# Patient Record
Sex: FEMALE | Employment: FULL TIME | URBAN - METROPOLITAN AREA
[De-identification: names, ages, dates, MRNs, and addresses within clinical notes are randomized per-mention and may not be internally consistent; named-entity substitution may affect disease eponyms.]

---

## 2017-09-01 ENCOUNTER — OFFICE VISIT (OUTPATIENT)
Dept: ORTHOPEDIC SURGERY | Age: 52
End: 2017-09-01

## 2017-09-01 VITALS
DIASTOLIC BLOOD PRESSURE: 85 MMHG | HEIGHT: 63 IN | SYSTOLIC BLOOD PRESSURE: 151 MMHG | HEART RATE: 65 BPM | RESPIRATION RATE: 16 BRPM | WEIGHT: 239.8 LBS | BODY MASS INDEX: 42.49 KG/M2

## 2017-09-01 DIAGNOSIS — M17.12 OSTEOARTHRITIS OF LEFT KNEE, UNSPECIFIED OSTEOARTHRITIS TYPE: Primary | ICD-10-CM

## 2017-09-01 RX ORDER — CLOTRIMAZOLE AND BETAMETHASONE DIPROPIONATE 10; .64 MG/G; MG/G
CREAM TOPICAL 2 TIMES DAILY
COMMUNITY

## 2017-09-01 RX ORDER — DOXYCYCLINE 100 MG/1
CAPSULE ORAL
Refills: 0 | COMMUNITY
Start: 2017-06-07

## 2017-09-01 RX ORDER — NIFEDIPINE 90 MG/1
TABLET, FILM COATED, EXTENDED RELEASE ORAL
Refills: 1 | COMMUNITY
Start: 2017-08-12

## 2017-09-01 RX ORDER — LOSARTAN POTASSIUM AND HYDROCHLOROTHIAZIDE 25; 100 MG/1; MG/1
TABLET ORAL
Refills: 1 | COMMUNITY
Start: 2017-08-12

## 2017-09-01 RX ORDER — ASPIRIN 81 MG/1
TABLET ORAL DAILY
COMMUNITY

## 2017-09-01 RX ORDER — METHYLPREDNISOLONE 4 MG/1
TABLET ORAL
Qty: 1 DOSE PACK | Refills: 0 | Status: SHIPPED | OUTPATIENT
Start: 2017-09-01

## 2017-09-01 RX ORDER — CHOLECALCIFEROL (VITAMIN D3) 125 MCG
CAPSULE ORAL
COMMUNITY

## 2017-09-01 NOTE — MR AVS SNAPSHOT
Visit Information Date & Time Provider Department Dept. Phone Encounter #  
 9/1/2017  1:55 PM El Lopez  [facility] [address] [facility] - SPECIALTY [facility] [phone] Follow-up Instructions Return in about 4 weeks (around 9/29/2017). Upcoming Health Maintenance Date Due Hepatitis C Screening 1965 DTaP/Tdap/Td series (1 - Tdap) 6/7/1986 PAP AKA CERVICAL CYTOLOGY 6/7/1986 BREAST CANCER SCRN MAMMOGRAM 6/7/2015 FOBT Q 1 YEAR AGE 50-75 6/7/2015 INFLUENZA AGE 9 TO ADULT 8/1/2017 Allergies as of 9/1/2017  Review Complete On: 9/1/2017 By: El Lopez MD  
 No Known Allergies Current Immunizations  Never Reviewed No immunizations on file. Not reviewed this visit You Were Diagnosed With   
  
 Codes Comments Osteoarthritis of left knee, unspecified osteoarthritis type    -  Primary ICD-10-CM: M17.12 
ICD-9-CM: 715.96 Vitals BP Pulse Resp Height(growth percentile) Weight(growth percentile) BMI  
 151/85 65 16 5' 3\" (1.6 m) 239 lb 12.8 oz (108.8 kg) 42.48 kg/m2 Smoking Status Never Smoker Vitals History BMI and BSA Data Body Mass Index Body Surface Area  
 42.48 kg/m 2 2.2 m 2 Preferred Pharmacy Pharmacy Name Phone [facility], [address] Your Updated Medication List  
  
   
This list is accurate as of: 9/1/17  3:10 PM.  Always use your most recent med list.  
  
  
  
  
 aspirin delayed-release 81 mg tablet Take  by mouth daily. clotrimazole-betamethasone topical cream  
Commonly known as:  Onalee Meraux Apply  to affected area two (2) times a day. doxycycline 100 mg capsule Commonly known as:  VIBRAMYCIN  
take 1 capsule by mouth once daily for 3 MONTHS WITH FOOD  
  
 ketoconazole 1 % Sham  
by Apply Externally route. losartan-hydroCHLOROthiazide 100-25 mg per tablet Commonly known as:  HYZAAR  
  
 methylPREDNISolone 4 mg tablet Commonly known as:  Reza Pena Per dose pack instructions NIFEdipine ER 90 mg ER tablet Commonly known as:  ADALAT CC  
  
 VITAMIN D3 2,000 unit Tab Generic drug:  cholecalciferol (vitamin D3) Take  by mouth. Prescriptions Sent to Pharmacy Refills  
 methylPREDNISolone (MEDROL DOSEPACK) 4 mg tablet 0 Sig: Per dose pack instructions Class: Normal  
 Pharmacy: [id number], [address] [place] [address] #: [phone] Follow-up Instructions Return in about 4 weeks (around 9/29/2017). Introducing \[facility] [place] [facility]\"" & HEALTH SERVICES! [facility] introduces [facility] patient portal. Now you can access parts of your medical record, email your doctor's office, and request medication refills online. 1. In your internet browser, go to [email or web address] 2. Click on the First Time User? Click Here link in the Sign In box. You will see the New Member Sign Up page. 3. Enter your [facility] Access Code exactly as it appears below. You will not need to use this code after youve completed the sign-up process. If you do not sign up before the expiration date, you must request a new code. · [facility] [employer] Access Code: [email or web address] Expires: 11/29/2017 11:26 AM 
 
4. Enter the last four digits of your Social Security Number (xxxx) and Date of Birth (mm/dd/yyyy) as indicated and click Submit. You will be taken to the next sign-up page. 5. Create a [employer] ID. This will be your [facility] login ID and cannot be changed, so think of one that is secure and easy to remember. 6. Create a [facility] password. You can change your password at any time. 7. Enter your Password Reset Question and Answer. This can be used at a later time if you forget your password. 8. Enter your e-mail address. You will receive e-mail notification when new information is available in [address] 9. Click Sign Up. You can now view and download portions of your medical record. 10. Click the Download Summary menu link to download a portable copy of your medical information. If you have questions, please visit the Frequently Asked Questions section of the "[employer])" website. Remember, "[employer] is NOT to be used for urgent needs. For medical emergencies, dial 911. Now available from your iPhone and Android! Please provide this summary of care documentation to your next provider. Your primary care clinician is listed as Galileo Hash. If you have any questions after today's visit, please call [phone].

## 2017-09-01 NOTE — PROGRESS NOTES
[facility]  [address], [address]  Phone: [phone]  Fax: [phone]        INITIAL CONSULTATION      HISTORY OF PRESENT ILLNESS:  Brooklyn Mahmood is a 46 y.o. female whom is referred from Dr. Billy Crump secondary to pain localized to the popliteal fossa of the left knee. Pt denies low back pain or radicular type symptoms. She rates pain 4/10. Symptoms do not occur at rest. Pt denies h/o spinal/knee pain or injections. Note from Dr. Billy Crump dated 6/28/17 indicating patient was seen with c/o left leg pain x 6 months. Of note, patient was seen in Urgent Care in January and February, and felt she pulled a muscle after exercising at home. At that time, she was treated with Flexeril with lack of relief. Pt denies h/o DM. The patient is RHD.  reviewed. Past Medical History:   Diagnosis Date    Hypertension    ORTH  Past Surgical History:   Procedure Laterality Date    HX ORTHOPAEDIC Left     pins in left ankle    HX OTHER SURGICAL      HX TUBAL LIGATION     OPAEDIC Left     pins in left ankle    HX OTHER SURGICAL      HX TUBAL LIGATION        Substance Use Topics    Smoking status: Never Smoker    Smokeless tobacco: Never Used    Alcohol use No     Work status: Not available. Marital status: The patient is single. No Known Allergies       No family history on file. REVIEW OF SYSTEMS  Constitutional symptoms: Negative  Eyes: Negative  Ears, Nose, Throat, and Mouth: Negative  Cardiovascular: Negative  Respiratory: Negative  Genitourinary: Negative  Integumentary (Skin and/or breast): Negative  Musculoskeletal: Positive for left knee pain. Extremities: Negative for edema.   Endocrine/Rheumatologic: Negative  Hematologic/Lymphatic: Negative  Allergic/Immunologic: Negative  Psychiatric: Negative       PHYSICAL EXAMINATION    Visit Vitals    /85  Comment: pt asymptomatic and has not taken her bp meds yet    Pulse 65    Resp 16    Ht 5' 3\" (1.6 m)    Wt 239 lb 12.8 oz (108.8 kg)    BMI 42.48 kg/m2       CONSTITUTIONAL: NAD, A&O x 3  HEART: Regular rate and rhythm  ABDOMEN: Positive bowel sounds, soft, nontender, and nondistended  LUNGS: Clear to auscultation bilaterally. SKIN: No rashes noted. RANGE OF MOTION: The patient has full passive range of motion in all four extremities. SENSATION: No significant increased tenderness to palpation of the left popliteal fossa. Sensation is intact to light touch throughout. MOTOR:   Straight Leg Raise: Negative, bilateral  Kelley: Negative, bilateral  Deep tendon reflexes are 2+ at the brachioradialis, biceps, and triceps. Deep tendon reflexes are 2+ at the knees and 1+ at the ankles bilaterally. Shoulder AB/Flex Elbow Flex Wrist Ext Elbow Ext Wrist Flex Hand Intrin Tone   Right +4/5 +4/5 +4/5 +4/5 +4/5 +4/5 +4/5   Left +4/5 +4/5 +4/5 +4/5 +4/5 +4/5 +4/5              Hip Flex Knee Ext Knee Flex Ankle DF GTE Ankle PF Tone   Right +4/5 +4/5 +4/5 +4/5 +4/5 +4/5 +4/5   Left +4/5 +4/5 +4/5 +4/5 +4/5 +4/5 +4/5       ASSESSMENT   Diagnoses and all orders for this visit:    1. Osteoarthritis of left knee, unspecified osteoarthritis type    Other orders  -     methylPREDNISolone (MEDROL DOSEPACK) 4 mg tablet; Per dose pack instructions           IMPRESSIONS/RECOMMENDATIONS:  Her pain is localized to the left popliteal fossa. Patient denies much in the way of low back or radicular type pain. She is neurologically intact. My sense is her symptoms are likely unrelated to spinal pathology and more related to knee pathology. I suspect she has a Baker's cyst. I offered to administer a left knee injection today in the office. She declines. I will start her on Medrol Dosepak. I will see the patient back in 1 month's time or earlier if needed. Written by Andriy Menjivar, as dictated by Mary Ann Knight MD  I examined the patient, reviewed and agree with the note.

## 2021-01-20 ENCOUNTER — HOSPITAL ENCOUNTER (OUTPATIENT)
Dept: PHYSICAL THERAPY | Age: 56
Discharge: HOME | End: 2021-01-20
Payer: COMMERCIAL

## 2021-01-20 PROCEDURE — 97140 MANUAL THERAPY 1/> REGIONS: CPT

## 2021-01-20 PROCEDURE — 97162 PT EVAL MOD COMPLEX 30 MIN: CPT

## 2021-01-20 PROCEDURE — 97110 THERAPEUTIC EXERCISES: CPT

## 2021-01-20 NOTE — PROGRESS NOTES
[facility]              [address]        [place], [address]   [phone] fax    Plan of Care/ Statement of Necessity for Physical Therapy Services  Patient name: Marni Balbuena Start of Care: 2021   Referral source: Mayra Davis MD : 1965    Medical Diagnosis: Low back pain [M54.5]  Payor: Rodrigo Bautista / Plan: Raheem Verdugo PPO / Product Type: PPO /  Onset Date:21    Treatment Diagnosis: Low back pain   Prior Hospitalization: see medical history Provider#: [id number]   Medications: Verified on Patient summary List    Comorbidities: OA, HTN, BMI >30, internal ankle hardware (s/p fracture )   Prior Level of Function: Independent, working full time without pain/difficulty     The Plan of Care and following information is based on the information from the initial evaluation. Assessment/ key information: Patient is 54year old female presenting with complaint of low back pain localized to the left side. She said the pain began about 2 weeks prior but with no specific incident. 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 Gait assessment reveals mildly increased lateral trunk translation towards left in stance and asymmetrical step length. Palpation in standing shows superior left iliac crest with ~1 inch leg length discrepancy observed in supine, improves with long axis distraction of the left lower extremity. Soft tissue evaluation reveals tenderness to palpation and trigger point of the left quadratus lumborum which is likely contributing to pelvic obliquity and leg length discrepancy. Lumbar AROM impaired in lateral flexion and rotation towards right. Lower extremity strength with full AROM but inability to maintain against resistance.  Patient would benefit from skilled physical therapy to improve upon the aforementioned deficits for return to pre-morbid status. Evaluation Complexity History MEDIUM  Complexity : 1-2 comorbidities / personal factors will impact the outcome/ POC ; Examination MEDIUM Complexity : 3 Standardized tests and measures addressing body structure, function, activity limitation and / or participation in recreation  ;Presentation MEDIUM Complexity : Evolving with changing characteristics  ; Clinical Decision Making LOW Complexity : FOTO score of   Overall Complexity Rating: MEDIUM  Problem List: pain affecting function, decrease ROM, impaired gait/ balance, decrease ADL/ functional abilitiies, decrease activity tolerance, decrease flexibility/ joint mobility and decrease transfer abilities   Treatment Plan may include any combination of the following: Therapeutic exercise, Therapeutic activities, Neuromuscular re-education, Gait/balance training, Manual therapy, Patient education, Functional mobility training and Stair training  Patient / Family readiness to learn indicated by: asking questions, trying to perform skills and interest  Persons(s) to be included in education: patient (P)  Barriers to Learning/Limitations: None  Patient Goal (s): to not have pain anymore  Patient Self Reported Health Status: good  Rehabilitation Potential: excellent    Short Term Goals: To be accomplished in 2 weeks:   1. Patient to be independent in HEP to maximize therapeutic benefit of care plan. 2. Patient to report standing for >=20 minutes with no onset of pain for improved tolerance to work related duties. Long Term Goals: To be accomplished in 4 weeks:   1. Patient to report standing at work for >=60 minuteswith no onset of pain for improved tolerance to work related duties. 2. No TTP to be present at left quadratus lumborum to indicate improvement in soft tissue mobility for improved pelvic symmetry and improved gait mechanics.    3. Patient to demonstrate symmetrical spinal AROM to for improved mechanics and reduced pain in ADLs and work tasks. Frequency / Duration: Patient to be seen 1-2 times per week for 4 weeks. Patient/ Caregiver education and instruction: Diagnosis, prognosis, self care, activity modification and exercises   [x]  Plan of care has been reviewed with FABIENNE Lezama, PT 1/20/2021 10:38 AM  ________________________________________________________________________    I certify that the above Therapy Services are being furnished while the patient is under my care. I agree with the treatment plan and certify that this therapy is necessary.     Physician's Signature:____________Date:_________TIME:________    ** Signature, Date and Time must be completed for valid certification **  Please sign and return to [facility]/ [address]              [address] [facility] [place], [address] [place]   [phone] fax

## 2021-01-20 NOTE — PROGRESS NOTES
PT DAILY TREATMENT NOTE     Patient Name: Amy Holden  Date:2021  : 1965  [x]  Patient  Verified  Payor: Ricky Doty / [place]: [place] [employer] / Product Type: PPO /    In time: 8:51 AM  Out time: 9:32 AM  Total Treatment Time (min): 41  Visit #: 1 of 8    Medicare/BCBS Only   Total Timed Codes (min):  16 1:1 Treatment Time:  41       Treatment Area: Low back pain [M54.5]    SUBJECTIVE  Pain Level (0-10 scale): 0  Any medication changes, allergies to medications, adverse drug reactions, diagnosis change, or new procedure performed?: [x] No    [] Yes (see summary sheet for update)  Subjective functional status/changes:   [] No changes reported  Patient agreeable to initial evaluation    OBJECTIVE    25 min [x]Eval                  []Re-Eval       8 min Therapeutic Exercise:  [x] See flow sheet : HEP initiated   Rationale: increase ROM, increase strength and improve coordination to improve the patients ability to compete work related tasks with greater ease. 8 min Manual Therapy:    Prone: TPR/DTM left quadratus lumborum/paraspinals  Supine: left LE long axis distraction   The manual therapy interventions were performed at a separate and distinct time from the therapeutic activities interventions. Rationale: increase ROM, increase tissue extensibility and decrease trigger points to maintain pelvic symmetry for improved mechanics and reduced pain in daily tasks.         With   [] TE   [] TA   [] neuro   [] other: Patient Education: [x] Review HEP    [] Progressed/Changed HEP based on:   [] positioning   [] body mechanics   [] transfers   [] heat/ice application    [] other:      Other Objective/Functional Measures: See IE     Pain Level (0-10 scale) post treatment: 2    ASSESSMENT/Changes in Function: See IE, reports \"I already feel like I'm walking better\" following evaluation/intervention    Patient will continue to benefit from skilled PT services to modify and progress therapeutic interventions, address functional mobility deficits, address ROM deficits, address strength deficits, analyze and address soft tissue restrictions, analyze and cue movement patterns and analyze and modify body mechanics/ergonomics to attain remaining goals. [x]  See Plan of Care  []  See progress note/recertification  []  See Discharge Summary         Progress towards goals / Updated goals:  Short Term Goals: To be accomplished in 2 weeks:               1. Patient to be independent in HEP to maximize therapeutic benefit of care plan. IE: HEP initiated              2. Patient to report standing for >=20 minutes with no onset of pain for improved tolerance to work related duties. IE: Reports tolerating static standing for 15 minutes until pain onset     Long Term Goals: To be accomplished in 4 weeks:               1. Patient to report standing at work for >=60 minutes with no onset of pain for improved tolerance to work related duties. IE: Reports tolerating static standing for 15 minutes until pain onset               2. No TTP to be present at left quadratus lumborum to indicate improvement in soft tissue mobility for improved pelvic symmetry and improved gait mechanics. IE: TTP 3-4/10 pain                3. Patient to demonstrate symmetrical spinal AROM to for improved mechanics and reduced pain in ADLs and work tasks.     IE: 50% limited in right lateral flexion, 75% limited in right rotation    PLAN  []  Upgrade activities as tolerated     [x]  Continue plan of care  []  Update interventions per flow sheet       []  Discharge due to:_  []  Other:_      Sepideh Bautista, PT 1/20/2021  11:00 AM    Future Appointments   Date Time Provider Pradeep Wasserman   1/27/2021 12:30 PM [place] Patch, [place] [facility]   2/3/2021 10:15 AM [place], [facility]   2/10/2021 10:15 AM [place] Patch, [facility]   2/17/2021 10:15 AM Allie Savage, PT [facility]

## 2021-01-27 ENCOUNTER — HOSPITAL ENCOUNTER (OUTPATIENT)
Dept: PHYSICAL THERAPY | Age: 56
Discharge: HOME | End: 2021-01-27
Payer: COMMERCIAL

## 2021-01-27 PROCEDURE — 97110 THERAPEUTIC EXERCISES: CPT

## 2021-01-27 PROCEDURE — 97112 NEUROMUSCULAR REEDUCATION: CPT

## 2021-01-27 PROCEDURE — 97140 MANUAL THERAPY 1/> REGIONS: CPT

## 2021-01-27 NOTE — PROGRESS NOTES
PT DAILY TREATMENT NOTE     Patient Name: Erma Tran  Date:2021  : 1965  [x]  Patient  Verified  Payor: Deya Multani / [place]: [place] [employer] / Product Type: PPO /    In time:1230  Out time:116  Total Treatment Time (min): 55  Visit #: 2 of 8    Treatment Area: Low back pain [M54.5]    SUBJECTIVE  Pain Level (0-10 scale): 0  Any medication changes, allergies to medications, adverse drug reactions, diagnosis change, or new procedure performed?: [x] No    [] Yes (see summary sheet for update)  Subjective functional status/changes:   [] No changes reported  Patient reports initiation of HEP with performance 2x/day without adverse response. Patient reports slight improvement in standing tolerance since start of care with performance of prescribed HEP. OBJECTIVE    24 min Therapeutic Exercise:  [x] See flow sheet : Emphasis placed on improving available lumbar and LE AROM and strength   Rationale: increase ROM and increase strength to improve the patients ability to improve ease with functional ADLs    14 min Neuromuscular Re-education:  [x]  See flow sheet : Emphasis placed on improving activation and recruitment of the anterior abdominal and gluteal musculature and improving lumbopelvic kinesthetic awareness   Rationale: increase ROM and increase strength  to improve the patients ability to improve ease with functional lifting    8 min Manual Therapy:    Supine, Left Hip Long Axis Distraction (OPP)  Supine, Left Hip Passive Physiological Grade III-IV Mobilization - Flexion  Right Sidelying, Left Lumbar Generalized Gapping   The manual therapy interventions were performed at a separate and distinct time from the therapeutic activities interventions.   Rationale: decrease pain, increase ROM and increase tissue extensibility to improve ease with work-related ADLs    With   [] TE   [] TA   [] neuro   [] other: Patient Education: [x] Review HEP    [] Progressed/Changed HEP based on:   [] positioning   [] body mechanics   [] transfers   [] heat/ice application    [] other:      Other Objective/Functional Measures:   Right Lateral Flexion 25% limited, Right Rotation 25% limited     Pain Level (0-10 scale) post treatment: 0    ASSESSMENT/Changes in Function: Initiated exercises per plan of care with patient objectively demonstrating a significant improvement in available lumbar AROM with patient with good tolerance to exercise performed. Patient demonstrates poor lumbopelvic dissociation in quadruped. Patient will continue to benefit from skilled PT services to modify and progress therapeutic interventions, address functional mobility deficits, address ROM deficits, address strength deficits, analyze and address soft tissue restrictions, analyze and cue movement patterns, analyze and modify body mechanics/ergonomics, assess and modify postural abnormalities, address imbalance/dizziness and instruct in home and community integration to attain remaining goals. []  See Plan of Care  []  See progress note/recertification  []  See Discharge Summary         Progress towards goals / Updated goals:    Short Term Goals: To be accomplished in 2 weeks:  1. Patient to be independent in HEP to maximize therapeutic benefit of care plan.   IE: HEP initiated  Current: Met HEP performance reported as prescribed, 1/27/2021  2. Patient to report standing for >=20 minutes with no onset of pain for improved tolerance to work related duties. IE: Reports tolerating static standing for 15 minutes until pain onset     Long Term Goals: To be accomplished in 4 weeks:  1. Patient to report standing at work for >=60 minutes with no onset of pain for improved tolerance to work related duties. IE: Reports tolerating static standing for 15 minutes until pain onset  2. No TTP to be present at left quadratus lumborum to indicate improvement in soft tissue mobility for improved pelvic symmetry and improved gait mechanics. IE: TTP 3-4/10 pain   3. Patient to demonstrate symmetrical spinal AROM to for improved mechanics and reduced pain in ADLs and work tasks.    IE: 50% limited in right lateral flexion, 75% limited in right rotation  Current: Progressing, Right Lateral Flexion 25% limited, Right Rotation 25% limited, 1/272021    PLAN  [x]  Upgrade activities as tolerated     [x]  Continue plan of care  []  Update interventions per flow sheet       []  Discharge due to:_  []  Other:_      Heidy Hampton, PT 1/27/2021  8:36 AM    Future Appointments   Date Time Provider Pradeep Wasserman   1/27/2021 12:30 PM Courtney Kim [facility]   2/3/2021 10:15 AM Maria Johnston PT [facility]   2/10/2021 10:15 AM Maria Johnston PT [facility]   2/17/2021 10:15 AM Coleen Savage, [place] [facility]

## 2021-02-03 ENCOUNTER — HOSPITAL ENCOUNTER (OUTPATIENT)
Dept: PHYSICAL THERAPY | Age: 56
Discharge: HOME | End: 2021-02-03
Payer: COMMERCIAL

## 2021-02-03 PROCEDURE — 97112 NEUROMUSCULAR REEDUCATION: CPT

## 2021-02-03 PROCEDURE — 97110 THERAPEUTIC EXERCISES: CPT

## 2021-02-03 PROCEDURE — 97140 MANUAL THERAPY 1/> REGIONS: CPT

## 2021-02-03 NOTE — PROGRESS NOTES
PT DAILY TREATMENT NOTE     Patient Name: Charlie Gresham  Date:2/3/2021  : 1965  [x]  Patient  Verified  Payor: Patricia Giles / Plan: [place] [employer] [place] / Product Type: PPO /    In time:1015  Out time:1108  Total Treatment Time (min): 48  Visit #: 3 of 8    Treatment Area: Low back pain [M54.5]    SUBJECTIVE  Pain Level (0-10 scale): 0  Any medication changes, allergies to medications, adverse drug reactions, diagnosis change, or new procedure performed?: [x] No    [] Yes (see summary sheet for update)  Subjective functional status/changes:   [] No changes reported  Patient reports overall improvement in symptoms after last treatment session with patient denying adverse response. OBJECTIVE    24 min Therapeutic Exercise:  [x]? See flow sheet : Emphasis placed on improving available lumbar and LE AROM and strength   Rationale: increase ROM and increase strength to improve the patients ability to improve ease with functional ADLs     21 min Neuromuscular Re-education:  [x]? See flow sheet : Emphasis placed on improving activation and recruitment of the anterior abdominal and gluteal musculature and improving lumbopelvic kinesthetic awareness   Rationale: increase ROM and increase strength  to improve the patients ability to improve ease with functional lifting     8 min Manual Therapy:    Supine, Left Hip Long Axis Distraction (OPP)  Supine, Left Hip Passive Physiological Grade III-IV Mobilization - Flexion  Right Sidelying, Left Lumbar Generalized Gapping   The manual therapy interventions were performed at a separate and distinct time from the therapeutic activities interventions.   Rationale: decrease pain, increase ROM and increase tissue extensibility to improve ease with work-related ADLs       With   [] TE   [] TA   [] neuro   [] other: Patient Education: [x] Review HEP    [] Progressed/Changed HEP based on:   [] positioning   [] body mechanics   [] transfers   [] heat/ice application    [] other: Other Objective/Functional Measures: Decreased available left hip flexion AROM in comparison to right hip flexion     Pain Level (0-10 scale) post treatment: 0    ASSESSMENT/Changes in Function: With significant subjective improvement in activity tolerance since start of care with further progression of plan of care with positive response having been demonstrated. Patient demonstrates improved lumbopelvic kinesthetic awareness in a supine position without cuing required. Patient will continue to benefit from skilled PT services to modify and progress therapeutic interventions, address functional mobility deficits, address ROM deficits, address strength deficits, analyze and address soft tissue restrictions, analyze and cue movement patterns, analyze and modify body mechanics/ergonomics, assess and modify postural abnormalities, address imbalance/dizziness and instruct in home and community integration to attain remaining goals. []  See Plan of Care  []  See progress note/recertification  []  See Discharge Summary         Progress towards goals / Updated goals:    Short Term Goals: To be accomplished in 2 weeks:  1. Patient to be independent in HEP to maximize therapeutic benefit of care plan.   IE: HEP initiated  Current: Met, HEP performance reported as prescribed, 1/27/2021  2. Patient to report standing for >=20 minutes with no onset of pain for improved tolerance to work related duties. IE: Reports tolerating static standing for 15 minutes until pain onset  Current: Progressing, Static Standing Tolerance x30 minutes = Pain Level 5/10, 2/3/2021     Long Term Goals: To be accomplished in 4 weeks:  1. Patient to report standing at work for >=60 minutes with no onset of pain for improved tolerance to work related duties. IE: Reports tolerating static standing for 15 minutes until pain onset  2.  No TTP to be present at left quadratus lumborum to indicate improvement in soft tissue mobility for improved pelvic symmetry and improved gait mechanics. IE: TTP 3-4/10 pain   3.  Patient to demonstrate symmetrical spinal AROM to for improved mechanics and reduced pain in ADLs and work tasks.   IE: 50% limited in right lateral flexion, 75% limited in right rotation  Current: Progressing, Right Lateral Flexion 25% limited, Right Rotation 25% limited, 1/272021       PLAN  [x]  Upgrade activities as tolerated     [x]  Continue plan of care  []  Update interventions per flow sheet       []  Discharge due to:_  []  Other:_      Juan R Dunn PT 2/3/2021  8:19 AM    Future Appointments   Date Time Provider Pradeep Wasserman   2/3/2021 10:15 AM [place] Patch, PT [facility]   2/10/2021 10:15 AM Deysi Patch, PT [facility]   2/17/2021 10:15 AM Allie Savage, PT [facility]

## 2021-02-10 ENCOUNTER — HOSPITAL ENCOUNTER (OUTPATIENT)
Dept: PHYSICAL THERAPY | Age: 56
Discharge: HOME | End: 2021-02-10
Payer: COMMERCIAL

## 2021-02-10 PROCEDURE — 97112 NEUROMUSCULAR REEDUCATION: CPT

## 2021-02-10 PROCEDURE — 97110 THERAPEUTIC EXERCISES: CPT

## 2021-02-10 NOTE — PROGRESS NOTES
PT DAILY TREATMENT NOTE     Patient Name: Giovanna Ledesma  Date:2/10/2021  : 1965  [x]  Patient  Verified  Payor: Lidia Vaca / Plan: [place] [employer] [place] / Product Type: PPO /    In time:1024  Out time:1111  Total Treatment Time (min): 52  Visit #: 4 of 8    Treatment Area: Low back pain [M54.5]    SUBJECTIVE  Pain Level (0-10 scale): 0  Any medication changes, allergies to medications, adverse drug reactions, diagnosis change, or new procedure performed?: [x] No    [] Yes (see summary sheet for update)  Subjective functional status/changes:   [] No changes reported  Patient reports overall significant improvement in symptoms since start of care. OBJECTIVE    27 min Therapeutic Exercise:  [x]? ? See flow sheet : Emphasis placed on improving available lumbar and LE AROM and strength   Rationale: increase ROM and increase strength to improve the patients ability to improve ease with functional ADLs     20 min Neuromuscular Re-education:  [x]? ?  See flow sheet : Emphasis placed on improving activation and recruitment of the anterior abdominal and gluteal musculature and improving lumbopelvic kinesthetic awareness   Rationale: increase ROM and increase strength  to improve the patients ability to improve ease with functional lifting     With   [] TE   [] TA   [] neuro   [] other: Patient Education: [x] Review HEP    [] Progressed/Changed HEP based on:   [] positioning   [] body mechanics   [] transfers   [] heat/ice application    [] other:      Other Objective/Functional Measures:   Palpation: No TTP noted to left quadratus lumborum      Pain Level (0-10 scale) post treatment: 0    ASSESSMENT/Changes in Function: With trialed hold of manual therapy in preparation for discharge next treatment session with patient having demonstrated a significant improvement in symptoms subjectively and objectively since start of care.      Patient will continue to benefit from skilled PT services to modify and progress therapeutic interventions, address functional mobility deficits, address ROM deficits, address strength deficits, analyze and address soft tissue restrictions, analyze and cue movement patterns, analyze and modify body mechanics/ergonomics and assess and modify postural abnormalities to attain remaining goals. []  See Plan of Care  []  See progress note/recertification  []  See Discharge Summary         Progress towards goals / Updated goals:    Short Term Goals: To be accomplished in 2 weeks:  1. Patient to be independent in HEP to maximize therapeutic benefit of care plan.   IE: HEP initiated  Current: Met, HEP performance reported as prescribed, 1/27/2021  2. Patient to report standing for >=20 minutes with no onset of pain for improved tolerance to work related duties. IE: Reports tolerating static standing for 15 minutes until pain onset   Current: Met, Static Standing Tolerance > 60 minutes (without pain provocation), 2/10/2021     Long Term Goals: To be accomplished in 4 weeks:  1. Patient to report standing at work for >=60 minutes with no onset of pain for improved tolerance to work related duties. IE: Reports tolerating static standing for 15 minutes until pain onset  Current: Met, Standing Tolerance > 60 minutes without symptom provocation, 2/10/2021  2. No TTP to be present at left quadratus lumborum to indicate improvement in soft tissue mobility for improved pelvic symmetry and improved gait mechanics. IE: TTP 3-4/10 pain   Current: Met, No TTP noted to left Quadratus lumborum with palpation, 2/10/2021  3.  Patient to demonstrate symmetrical spinal AROM to for improved mechanics and reduced pain in ADLs and work tasks.   IE: 50% limited in right lateral flexion, 75% limited in right rotation  Current: Progressing, Right Lateral Flexion 25% limited, Right Rotation 25% limited, 1/272021     PLAN  [x]  Upgrade activities as tolerated     [x]  Continue plan of care  []  Update interventions per flow sheet []  Discharge due to:_  []  Other:_      Will Harris, PT 2/10/2021  8:33 AM    Future Appointments   Date Time Provider Pradeep Wasserman   2/10/2021 10:15 AM Kacey Powell, PT [facility]   2/17/2021 10:15 AM Argentnia Hutchinson, PT [facility]

## 2021-02-17 ENCOUNTER — HOSPITAL ENCOUNTER (OUTPATIENT)
Dept: PHYSICAL THERAPY | Age: 56
Discharge: HOME | End: 2021-02-17
Payer: COMMERCIAL

## 2021-02-17 PROCEDURE — 97112 NEUROMUSCULAR REEDUCATION: CPT

## 2021-02-17 PROCEDURE — 97110 THERAPEUTIC EXERCISES: CPT

## 2021-02-17 NOTE — PROGRESS NOTES
Physical Therapy Discharge Instructions      [facility]   [address]   [place], [address]   [phone] fax    Patient: Cierra Angel  : 1965      Continue Home Exercise Program 1-2 times per day      Continue with    [x] Ice  as needed      [x] Heat           Follow up with MD:     [x] Upon completion of therapy/As scheduled     [] As needed      Recommendations:     [x]   Return to activity with home program    []   Return to activity with the following modifications:       []Post Rehab Program    []Join Independent aquatic program     []Return to/join local gym        Additional Comments: Continue with prescribed home exercise program as instructed to allow for continued symptom management post-discharge.           Christopher Wyatt, PT 2021 10:36 AM

## 2021-02-17 NOTE — PROGRESS NOTES
PT DAILY TREATMENT NOTE     Patient Name: Morena Began  Date:2021  : 1965  [x]  Patient  Verified  Payor: Srinivasa Mercado / Plan: [place] [employer] [place] / Product Type: PPO /    In time:1019  Out time:1106  Total Treatment Time (min): 52  Visit #: 5 of 8    Treatment Area: Low back pain [M54.5]    SUBJECTIVE  Pain Level (0-10 scale): 0  Any medication changes, allergies to medications, adverse drug reactions, diagnosis change, or new procedure performed?: [x] No    [] Yes (see summary sheet for update)  Subjective functional status/changes:   [] No changes reported  Patient reports agreement with discharge today. OBJECTIVE    24 min Therapeutic Exercise:  [x]? ?? See flow sheet : Emphasis placed on improving available lumbar and LE AROM and strength, Completion of FOTO, Review of discharge instructions in preparation for discharge   Rationale: increase ROM and increase strength to improve the patients ability to improve ease with functional ADLs     23 min Neuromuscular Re-education:  [x]? ??  See flow sheet : Emphasis placed on improving activation and recruitment of the anterior abdominal and gluteal musculature and improving lumbopelvic kinesthetic awareness   Rationale: increase ROM and increase strength  to improve the patients ability to improve ease with functional lifting        With   [] TE   [] TA   [] neuro   [] other: Patient Education: [x] Review HEP    [] Progressed/Changed HEP based on:   [] positioning   [] body mechanics   [] transfers   [] heat/ice application    [] other:      Other Objective/Functional Measures: See goals below. Pain Level (0-10 scale) post treatment: 0    ASSESSMENT/Changes in Function: At this time patient suitable for discharge with patient having met most created therapeutic goals and independent with prescribed home exercise program to allow for continued symptom management independently post-discharge.  Objectively patient has demonstrated a significant improvement in available lumbar pain-free AROM with patient subjectively without limitations with prolonged standing thus improving patient ease and tolerance with work-related ADLs. Patient in agreement with plan of care and without questions. []  See Plan of Care  []  See progress note/recertification  [x]  See Discharge Summary         Progress towards goals / Updated goals:    Short Term Goals: To be accomplished in 2 weeks:  1. Patient to be independent in HEP to maximize therapeutic benefit of care plan.   IE: HEP initiated  Current: Met, HEP performance reported as prescribed, 1/27/2021  2. Patient to report standing for >=20 minutes with no onset of pain for improved tolerance to work related duties. IE: Reports tolerating static standing for 15 minutes until pain onset   Current: Met, Static Standing Tolerance > 60 minutes (without pain provocation), 2/10/2021     Long Term Goals: To be accomplished in 4 weeks:  1. Patient to report standing at work for >=60 minutes with no onset of pain for improved tolerance to work related duties. IE: Reports tolerating static standing for 15 minutes until pain onset  Current: Met, Standing Tolerance > 60 minutes without symptom provocation, 2/10/2021  2. No TTP to be present at left quadratus lumborum to indicate improvement in soft tissue mobility for improved pelvic symmetry and improved gait mechanics. IE: TTP 3-4/10 pain   Current: Met, No TTP noted to left Quadratus lumborum with palpation, 2/10/2021  3.  Patient to demonstrate symmetrical spinal AROM to for improved mechanics and reduced pain in ADLs and work tasks.   IE: 50% limited in right lateral flexion, 75% limited in right rotation  Current: Progressing, Right Lateral Flexion 25% limited, Right Rotation 25% limited, 2/17/2021    PLAN  []  Upgrade activities as tolerated     []  Continue plan of care  []  Update interventions per flow sheet       [x]  Discharge due to:_  []  Other:_      Mery Gonzales PT 2/17/2021  8:28 AM    Future Appointments   Date Time Provider Pradeep Wasserman   2/17/2021 10:15 AM Janusz Cagle, PT [facility]

## 2021-02-17 NOTE — PROGRESS NOTES
[facility]              [address]        [place], [address]   [phone] fax    Discharge Summary  Patient name: Naomi Reddy Start of Care: 2021   Referral source: Yonathan Roy MD : 1965   Medical/Treatment Diagnosis: Low back pain [M54.5]  Payor: Jose Holbrook / Plan: Gulshan Rosen PPO / Product Type: PPO /  Onset Date:2021     Prior Hospitalization: see medical history Provider#: [id number]   Medications: Verified on Patient Summary List     Comorbidities: OA, HTN, BMI >30, internal ankle hardware (s/p fracture )   Prior Level of Function: Independent, working full time without pain/difficulty  Visits from Start of Care: 5    Missed Visits: 0  Reporting Period : 2021 to 2021    Summary of Care:    Short Term Goals: To be accomplished in 2 weeks:  1. Patient to be independent in HEP to maximize therapeutic benefit of care plan.   IE: HEP initiated  Current: Met, HEP performance reported as prescribed  2. Patient to report standing for >=20 minutes with no onset of pain for improved tolerance to work related duties. IE: Reports tolerating static standing for 15 minutes until pain onset   Current: Met, Static Standing Tolerance > 60 minutes (without pain provocation)     Long Term Goals: To be accomplished in 4 weeks:  1. Patient to report standing at work for >=60 minutes with no onset of pain for improved tolerance to work related duties. IE: Reports tolerating static standing for 15 minutes until pain onset  Current: Met, Standing Tolerance > 60 minutes without symptom provocation  2. No TTP to be present at left quadratus lumborum to indicate improvement in soft tissue mobility for improved pelvic symmetry and improved gait mechanics. IE: TTP 3-4/10 pain   Current: Met, No TTP noted to left Quadratus lumborum with palpation  3.  Patient to demonstrate symmetrical spinal AROM to for improved mechanics and reduced pain in ADLs and work tasks.   IE: 50% limited in right lateral flexion, 75% limited in right rotation  Current: Progressing, Right Lateral Flexion 25% limited, Right Rotation 25% limited    ASSESSMENT/RECOMMENDATIONS:    At this time patient suitable for discharge with patient having met most created therapeutic goals and independent with prescribed home exercise program to allow for continued symptom management independently post-discharge. Objectively patient has demonstrated a significant improvement in available lumbar pain-free AROM with patient subjectively without limitations with prolonged standing thus improving patient ease and tolerance with work-related ADLs. Patient in agreement with plan of care and without questions.      [x]Discontinue therapy: [x]Patient has reached or is progressing toward set goals      []Patient is non-compliant or has abdicated      []Due to lack of appreciable progress towards set goals    Francis Vaughn, PT 2/17/2021 8:29 AM